# Patient Record
Sex: FEMALE | Race: WHITE | NOT HISPANIC OR LATINO | Employment: FULL TIME | URBAN - METROPOLITAN AREA
[De-identification: names, ages, dates, MRNs, and addresses within clinical notes are randomized per-mention and may not be internally consistent; named-entity substitution may affect disease eponyms.]

---

## 2023-09-13 LAB
ANION GAP IN SER/PLAS: 12 MMOL/L (ref 10–20)
BASOPHILS (10*3/UL) IN BLOOD BY AUTOMATED COUNT: 0.05 X10E9/L (ref 0–0.1)
BASOPHILS/100 LEUKOCYTES IN BLOOD BY AUTOMATED COUNT: 1 % (ref 0–2)
CALCIUM (MG/DL) IN SER/PLAS: 8.3 MG/DL (ref 8.6–10.3)
CARBON DIOXIDE, TOTAL (MMOL/L) IN SER/PLAS: 27 MMOL/L (ref 21–32)
CHLORIDE (MMOL/L) IN SER/PLAS: 105 MMOL/L (ref 98–107)
CREATININE (MG/DL) IN SER/PLAS: 0.77 MG/DL (ref 0.5–1.05)
EOSINOPHILS (10*3/UL) IN BLOOD BY AUTOMATED COUNT: 0.17 X10E9/L (ref 0–0.7)
EOSINOPHILS/100 LEUKOCYTES IN BLOOD BY AUTOMATED COUNT: 3.6 % (ref 0–6)
ERYTHROCYTE DISTRIBUTION WIDTH (RATIO) BY AUTOMATED COUNT: 13 % (ref 11.5–14.5)
ERYTHROCYTE MEAN CORPUSCULAR HEMOGLOBIN CONCENTRATION (G/DL) BY AUTOMATED: 32.8 G/DL (ref 32–36)
ERYTHROCYTE MEAN CORPUSCULAR VOLUME (FL) BY AUTOMATED COUNT: 87 FL (ref 80–100)
ERYTHROCYTES (10*6/UL) IN BLOOD BY AUTOMATED COUNT: 4.68 X10E12/L (ref 4–5.2)
GFR FEMALE: >90 ML/MIN/1.73M2
GLUCOSE (MG/DL) IN SER/PLAS: 71 MG/DL (ref 74–99)
HEMATOCRIT (%) IN BLOOD BY AUTOMATED COUNT: 40.8 % (ref 36–46)
HEMOGLOBIN (G/DL) IN BLOOD: 13.4 G/DL (ref 12–16)
IMMATURE GRANULOCYTES/100 LEUKOCYTES IN BLOOD BY AUTOMATED COUNT: 0 % (ref 0–0.9)
LEUKOCYTES (10*3/UL) IN BLOOD BY AUTOMATED COUNT: 4.8 X10E9/L (ref 4.4–11.3)
LYMPHOCYTES (10*3/UL) IN BLOOD BY AUTOMATED COUNT: 2.2 X10E9/L (ref 1.2–4.8)
LYMPHOCYTES/100 LEUKOCYTES IN BLOOD BY AUTOMATED COUNT: 46 % (ref 13–44)
MONOCYTES (10*3/UL) IN BLOOD BY AUTOMATED COUNT: 0.34 X10E9/L (ref 0.1–1)
MONOCYTES/100 LEUKOCYTES IN BLOOD BY AUTOMATED COUNT: 7.1 % (ref 2–10)
NEUTROPHILS (10*3/UL) IN BLOOD BY AUTOMATED COUNT: 2.02 X10E9/L (ref 1.2–7.7)
NEUTROPHILS/100 LEUKOCYTES IN BLOOD BY AUTOMATED COUNT: 42.3 % (ref 40–80)
PLATELETS (10*3/UL) IN BLOOD AUTOMATED COUNT: 279 X10E9/L (ref 150–450)
POTASSIUM (MMOL/L) IN SER/PLAS: 4.5 MMOL/L (ref 3.5–5.3)
SODIUM (MMOL/L) IN SER/PLAS: 139 MMOL/L (ref 136–145)
UREA NITROGEN (MG/DL) IN SER/PLAS: 18 MG/DL (ref 6–23)

## 2023-09-15 LAB — STAPH/MRSA SCREEN, CULTURE: NORMAL

## 2023-09-20 ENCOUNTER — HOSPITAL ENCOUNTER (OUTPATIENT)
Dept: DATA CONVERSION | Facility: HOSPITAL | Age: 53
End: 2023-09-26
Attending: ORTHOPAEDIC SURGERY | Admitting: ORTHOPAEDIC SURGERY
Payer: COMMERCIAL

## 2023-09-20 DIAGNOSIS — Z87.891 PERSONAL HISTORY OF NICOTINE DEPENDENCE: ICD-10-CM

## 2023-09-20 DIAGNOSIS — R51.9 HEADACHE, UNSPECIFIED: ICD-10-CM

## 2023-09-20 DIAGNOSIS — M16.12 UNILATERAL PRIMARY OSTEOARTHRITIS, LEFT HIP: ICD-10-CM

## 2023-09-21 LAB
ANION GAP IN SER/PLAS: 12 MMOL/L (ref 10–20)
BASOPHILS (10*3/UL) IN BLOOD BY AUTOMATED COUNT: 0.03 X10E9/L (ref 0–0.1)
BASOPHILS/100 LEUKOCYTES IN BLOOD BY AUTOMATED COUNT: 0.6 % (ref 0–2)
CALCIUM (MG/DL) IN SER/PLAS: 7.9 MG/DL (ref 8.6–10.3)
CARBON DIOXIDE, TOTAL (MMOL/L) IN SER/PLAS: 26 MMOL/L (ref 21–32)
CHLORIDE (MMOL/L) IN SER/PLAS: 102 MMOL/L (ref 98–107)
CREATININE (MG/DL) IN SER/PLAS: 0.73 MG/DL (ref 0.5–1.05)
EOSINOPHILS (10*3/UL) IN BLOOD BY AUTOMATED COUNT: 0.07 X10E9/L (ref 0–0.7)
EOSINOPHILS/100 LEUKOCYTES IN BLOOD BY AUTOMATED COUNT: 1.3 % (ref 0–6)
ERYTHROCYTE DISTRIBUTION WIDTH (RATIO) BY AUTOMATED COUNT: 13.1 % (ref 11.5–14.5)
ERYTHROCYTE MEAN CORPUSCULAR HEMOGLOBIN CONCENTRATION (G/DL) BY AUTOMATED: 32.5 G/DL (ref 32–36)
ERYTHROCYTE MEAN CORPUSCULAR VOLUME (FL) BY AUTOMATED COUNT: 88 FL (ref 80–100)
ERYTHROCYTES (10*6/UL) IN BLOOD BY AUTOMATED COUNT: 3.99 X10E12/L (ref 4–5.2)
GFR FEMALE: >90 ML/MIN/1.73M2
GLUCOSE (MG/DL) IN SER/PLAS: 94 MG/DL (ref 74–99)
HEMATOCRIT (%) IN BLOOD BY AUTOMATED COUNT: 35.1 % (ref 36–46)
HEMOGLOBIN (G/DL) IN BLOOD: 11.4 G/DL (ref 12–16)
IMMATURE GRANULOCYTES/100 LEUKOCYTES IN BLOOD BY AUTOMATED COUNT: 0.2 % (ref 0–0.9)
LEUKOCYTES (10*3/UL) IN BLOOD BY AUTOMATED COUNT: 5.3 X10E9/L (ref 4.4–11.3)
LYMPHOCYTES (10*3/UL) IN BLOOD BY AUTOMATED COUNT: 1.49 X10E9/L (ref 1.2–4.8)
LYMPHOCYTES/100 LEUKOCYTES IN BLOOD BY AUTOMATED COUNT: 28.2 % (ref 13–44)
MONOCYTES (10*3/UL) IN BLOOD BY AUTOMATED COUNT: 0.42 X10E9/L (ref 0.1–1)
MONOCYTES/100 LEUKOCYTES IN BLOOD BY AUTOMATED COUNT: 7.9 % (ref 2–10)
NEUTROPHILS (10*3/UL) IN BLOOD BY AUTOMATED COUNT: 3.27 X10E9/L (ref 1.2–7.7)
NEUTROPHILS/100 LEUKOCYTES IN BLOOD BY AUTOMATED COUNT: 61.8 % (ref 40–80)
PLATELETS (10*3/UL) IN BLOOD AUTOMATED COUNT: 255 X10E9/L (ref 150–450)
POTASSIUM (MMOL/L) IN SER/PLAS: 4.3 MMOL/L (ref 3.5–5.3)
SODIUM (MMOL/L) IN SER/PLAS: 136 MMOL/L (ref 136–145)
UREA NITROGEN (MG/DL) IN SER/PLAS: 11 MG/DL (ref 6–23)

## 2023-09-22 LAB
ANION GAP IN SER/PLAS: 8 MMOL/L (ref 10–20)
BASOPHILS (10*3/UL) IN BLOOD BY AUTOMATED COUNT: 0.05 X10E9/L (ref 0–0.1)
BASOPHILS/100 LEUKOCYTES IN BLOOD BY AUTOMATED COUNT: 0.6 % (ref 0–2)
CALCIUM (MG/DL) IN SER/PLAS: 7.5 MG/DL (ref 8.6–10.3)
CARBON DIOXIDE, TOTAL (MMOL/L) IN SER/PLAS: 28 MMOL/L (ref 21–32)
CHLORIDE (MMOL/L) IN SER/PLAS: 105 MMOL/L (ref 98–107)
CREATININE (MG/DL) IN SER/PLAS: 0.79 MG/DL (ref 0.5–1.05)
EOSINOPHILS (10*3/UL) IN BLOOD BY AUTOMATED COUNT: 0.12 X10E9/L (ref 0–0.7)
EOSINOPHILS/100 LEUKOCYTES IN BLOOD BY AUTOMATED COUNT: 1.5 % (ref 0–6)
ERYTHROCYTE DISTRIBUTION WIDTH (RATIO) BY AUTOMATED COUNT: 13.2 % (ref 11.5–14.5)
ERYTHROCYTE MEAN CORPUSCULAR HEMOGLOBIN CONCENTRATION (G/DL) BY AUTOMATED: 32.2 G/DL (ref 32–36)
ERYTHROCYTE MEAN CORPUSCULAR VOLUME (FL) BY AUTOMATED COUNT: 90 FL (ref 80–100)
ERYTHROCYTES (10*6/UL) IN BLOOD BY AUTOMATED COUNT: 3.87 X10E12/L (ref 4–5.2)
GFR FEMALE: 89 ML/MIN/1.73M2
GLUCOSE (MG/DL) IN SER/PLAS: 105 MG/DL (ref 74–99)
HEMATOCRIT (%) IN BLOOD BY AUTOMATED COUNT: 34.8 % (ref 36–46)
HEMOGLOBIN (G/DL) IN BLOOD: 11.2 G/DL (ref 12–16)
IMMATURE GRANULOCYTES/100 LEUKOCYTES IN BLOOD BY AUTOMATED COUNT: 0.4 % (ref 0–0.9)
LEUKOCYTES (10*3/UL) IN BLOOD BY AUTOMATED COUNT: 7.8 X10E9/L (ref 4.4–11.3)
LYMPHOCYTES (10*3/UL) IN BLOOD BY AUTOMATED COUNT: 1.2 X10E9/L (ref 1.2–4.8)
LYMPHOCYTES/100 LEUKOCYTES IN BLOOD BY AUTOMATED COUNT: 15.3 % (ref 13–44)
MONOCYTES (10*3/UL) IN BLOOD BY AUTOMATED COUNT: 0.56 X10E9/L (ref 0.1–1)
MONOCYTES/100 LEUKOCYTES IN BLOOD BY AUTOMATED COUNT: 7.2 % (ref 2–10)
NEUTROPHILS (10*3/UL) IN BLOOD BY AUTOMATED COUNT: 5.86 X10E9/L (ref 1.2–7.7)
NEUTROPHILS/100 LEUKOCYTES IN BLOOD BY AUTOMATED COUNT: 75 % (ref 40–80)
PLATELETS (10*3/UL) IN BLOOD AUTOMATED COUNT: 215 X10E9/L (ref 150–450)
POCT GLUCOSE: 80 MG/DL (ref 74–99)
POTASSIUM (MMOL/L) IN SER/PLAS: 4.1 MMOL/L (ref 3.5–5.3)
SODIUM (MMOL/L) IN SER/PLAS: 137 MMOL/L (ref 136–145)
UREA NITROGEN (MG/DL) IN SER/PLAS: 12 MG/DL (ref 6–23)

## 2023-09-23 LAB
ANION GAP IN SER/PLAS: 10 MMOL/L (ref 10–20)
BASOPHILS (10*3/UL) IN BLOOD BY AUTOMATED COUNT: 0.03 X10E9/L (ref 0–0.1)
BASOPHILS/100 LEUKOCYTES IN BLOOD BY AUTOMATED COUNT: 0.5 % (ref 0–2)
CALCIUM (MG/DL) IN SER/PLAS: 7.7 MG/DL (ref 8.6–10.3)
CARBON DIOXIDE, TOTAL (MMOL/L) IN SER/PLAS: 28 MMOL/L (ref 21–32)
CHLORIDE (MMOL/L) IN SER/PLAS: 104 MMOL/L (ref 98–107)
CREATININE (MG/DL) IN SER/PLAS: 0.68 MG/DL (ref 0.5–1.05)
EOSINOPHILS (10*3/UL) IN BLOOD BY AUTOMATED COUNT: 0.2 X10E9/L (ref 0–0.7)
EOSINOPHILS/100 LEUKOCYTES IN BLOOD BY AUTOMATED COUNT: 3.2 % (ref 0–6)
ERYTHROCYTE DISTRIBUTION WIDTH (RATIO) BY AUTOMATED COUNT: 13.1 % (ref 11.5–14.5)
ERYTHROCYTE MEAN CORPUSCULAR HEMOGLOBIN CONCENTRATION (G/DL) BY AUTOMATED: 31.8 G/DL (ref 32–36)
ERYTHROCYTE MEAN CORPUSCULAR VOLUME (FL) BY AUTOMATED COUNT: 90 FL (ref 80–100)
ERYTHROCYTES (10*6/UL) IN BLOOD BY AUTOMATED COUNT: 3.57 X10E12/L (ref 4–5.2)
GFR FEMALE: >90 ML/MIN/1.73M2
GLUCOSE (MG/DL) IN SER/PLAS: 111 MG/DL (ref 74–99)
HEMATOCRIT (%) IN BLOOD BY AUTOMATED COUNT: 32.1 % (ref 36–46)
HEMOGLOBIN (G/DL) IN BLOOD: 10.2 G/DL (ref 12–16)
IMMATURE GRANULOCYTES/100 LEUKOCYTES IN BLOOD BY AUTOMATED COUNT: 0.3 % (ref 0–0.9)
LEUKOCYTES (10*3/UL) IN BLOOD BY AUTOMATED COUNT: 6.2 X10E9/L (ref 4.4–11.3)
LYMPHOCYTES (10*3/UL) IN BLOOD BY AUTOMATED COUNT: 1.12 X10E9/L (ref 1.2–4.8)
LYMPHOCYTES/100 LEUKOCYTES IN BLOOD BY AUTOMATED COUNT: 18 % (ref 13–44)
MONOCYTES (10*3/UL) IN BLOOD BY AUTOMATED COUNT: 0.47 X10E9/L (ref 0.1–1)
MONOCYTES/100 LEUKOCYTES IN BLOOD BY AUTOMATED COUNT: 7.6 % (ref 2–10)
NEUTROPHILS (10*3/UL) IN BLOOD BY AUTOMATED COUNT: 4.38 X10E9/L (ref 1.2–7.7)
NEUTROPHILS/100 LEUKOCYTES IN BLOOD BY AUTOMATED COUNT: 70.4 % (ref 40–80)
PLATELETS (10*3/UL) IN BLOOD AUTOMATED COUNT: 205 X10E9/L (ref 150–450)
POTASSIUM (MMOL/L) IN SER/PLAS: 4 MMOL/L (ref 3.5–5.3)
SODIUM (MMOL/L) IN SER/PLAS: 138 MMOL/L (ref 136–145)
UREA NITROGEN (MG/DL) IN SER/PLAS: 10 MG/DL (ref 6–23)

## 2023-09-24 LAB
ANION GAP IN SER/PLAS: 10 MMOL/L (ref 10–20)
CALCIUM (MG/DL) IN SER/PLAS: 8 MG/DL (ref 8.6–10.3)
CARBON DIOXIDE, TOTAL (MMOL/L) IN SER/PLAS: 27 MMOL/L (ref 21–32)
CHLORIDE (MMOL/L) IN SER/PLAS: 105 MMOL/L (ref 98–107)
CREATININE (MG/DL) IN SER/PLAS: 0.7 MG/DL (ref 0.5–1.05)
ERYTHROCYTE DISTRIBUTION WIDTH (RATIO) BY AUTOMATED COUNT: 13.4 % (ref 11.5–14.5)
ERYTHROCYTE MEAN CORPUSCULAR HEMOGLOBIN CONCENTRATION (G/DL) BY AUTOMATED: 31.4 G/DL (ref 32–36)
ERYTHROCYTE MEAN CORPUSCULAR VOLUME (FL) BY AUTOMATED COUNT: 92 FL (ref 80–100)
ERYTHROCYTES (10*6/UL) IN BLOOD BY AUTOMATED COUNT: 3.97 X10E12/L (ref 4–5.2)
GFR FEMALE: >90 ML/MIN/1.73M2
GLUCOSE (MG/DL) IN SER/PLAS: 85 MG/DL (ref 74–99)
HEMATOCRIT (%) IN BLOOD BY AUTOMATED COUNT: 36.6 % (ref 36–46)
HEMOGLOBIN (G/DL) IN BLOOD: 11.5 G/DL (ref 12–16)
LEUKOCYTES (10*3/UL) IN BLOOD BY AUTOMATED COUNT: 5.8 X10E9/L (ref 4.4–11.3)
PLATELETS (10*3/UL) IN BLOOD AUTOMATED COUNT: 190 X10E9/L (ref 150–450)
POTASSIUM (MMOL/L) IN SER/PLAS: 4.1 MMOL/L (ref 3.5–5.3)
SODIUM (MMOL/L) IN SER/PLAS: 138 MMOL/L (ref 136–145)
UREA NITROGEN (MG/DL) IN SER/PLAS: 8 MG/DL (ref 6–23)

## 2023-09-25 LAB
ANION GAP IN SER/PLAS: 11 MMOL/L (ref 10–20)
CALCIUM (MG/DL) IN SER/PLAS: 8.3 MG/DL (ref 8.6–10.3)
CARBON DIOXIDE, TOTAL (MMOL/L) IN SER/PLAS: 25 MMOL/L (ref 21–32)
CHLORIDE (MMOL/L) IN SER/PLAS: 106 MMOL/L (ref 98–107)
CREATININE (MG/DL) IN SER/PLAS: 0.64 MG/DL (ref 0.5–1.05)
ERYTHROCYTE DISTRIBUTION WIDTH (RATIO) BY AUTOMATED COUNT: 13.2 % (ref 11.5–14.5)
ERYTHROCYTE MEAN CORPUSCULAR HEMOGLOBIN CONCENTRATION (G/DL) BY AUTOMATED: 32.4 G/DL (ref 32–36)
ERYTHROCYTE MEAN CORPUSCULAR VOLUME (FL) BY AUTOMATED COUNT: 87 FL (ref 80–100)
ERYTHROCYTES (10*6/UL) IN BLOOD BY AUTOMATED COUNT: 3.94 X10E12/L (ref 4–5.2)
GFR FEMALE: >90 ML/MIN/1.73M2
GLUCOSE (MG/DL) IN SER/PLAS: 161 MG/DL (ref 74–99)
HEMATOCRIT (%) IN BLOOD BY AUTOMATED COUNT: 34.3 % (ref 36–46)
HEMOGLOBIN (G/DL) IN BLOOD: 11.1 G/DL (ref 12–16)
LEUKOCYTES (10*3/UL) IN BLOOD BY AUTOMATED COUNT: 4.7 X10E9/L (ref 4.4–11.3)
PLATELETS (10*3/UL) IN BLOOD AUTOMATED COUNT: 287 X10E9/L (ref 150–450)
POTASSIUM (MMOL/L) IN SER/PLAS: 3.9 MMOL/L (ref 3.5–5.3)
SODIUM (MMOL/L) IN SER/PLAS: 138 MMOL/L (ref 136–145)
UREA NITROGEN (MG/DL) IN SER/PLAS: 10 MG/DL (ref 6–23)

## 2023-09-26 LAB
ANION GAP IN SER/PLAS: 13 MMOL/L (ref 10–20)
CALCIUM (MG/DL) IN SER/PLAS: 8.3 MG/DL (ref 8.6–10.3)
CARBON DIOXIDE, TOTAL (MMOL/L) IN SER/PLAS: 25 MMOL/L (ref 21–32)
CHLORIDE (MMOL/L) IN SER/PLAS: 105 MMOL/L (ref 98–107)
CREATININE (MG/DL) IN SER/PLAS: 0.71 MG/DL (ref 0.5–1.05)
ERYTHROCYTE DISTRIBUTION WIDTH (RATIO) BY AUTOMATED COUNT: 13.4 % (ref 11.5–14.5)
ERYTHROCYTE MEAN CORPUSCULAR HEMOGLOBIN CONCENTRATION (G/DL) BY AUTOMATED: 32.3 G/DL (ref 32–36)
ERYTHROCYTE MEAN CORPUSCULAR VOLUME (FL) BY AUTOMATED COUNT: 89 FL (ref 80–100)
ERYTHROCYTES (10*6/UL) IN BLOOD BY AUTOMATED COUNT: 3.75 X10E12/L (ref 4–5.2)
GFR FEMALE: >90 ML/MIN/1.73M2
GLUCOSE (MG/DL) IN SER/PLAS: 92 MG/DL (ref 74–99)
HEMATOCRIT (%) IN BLOOD BY AUTOMATED COUNT: 33.4 % (ref 36–46)
HEMOGLOBIN (G/DL) IN BLOOD: 10.8 G/DL (ref 12–16)
LEUKOCYTES (10*3/UL) IN BLOOD BY AUTOMATED COUNT: 7.1 X10E9/L (ref 4.4–11.3)
PLATELETS (10*3/UL) IN BLOOD AUTOMATED COUNT: 323 X10E9/L (ref 150–450)
POTASSIUM (MMOL/L) IN SER/PLAS: 4.1 MMOL/L (ref 3.5–5.3)
SODIUM (MMOL/L) IN SER/PLAS: 139 MMOL/L (ref 136–145)
UREA NITROGEN (MG/DL) IN SER/PLAS: 15 MG/DL (ref 6–23)

## 2023-09-29 VITALS
SYSTOLIC BLOOD PRESSURE: 121 MMHG | DIASTOLIC BLOOD PRESSURE: 79 MMHG | HEIGHT: 67 IN | WEIGHT: 252.43 LBS | BODY MASS INDEX: 39.62 KG/M2

## 2023-09-30 NOTE — PROGRESS NOTES
Service: Orthopaedics     Subjective Data:   VIRGILIO POLO is a 53 year old Female who is Hospital Day # 4 and POD #3 for 1. left total hip arthroplasty;2. ;3. ;4. ;5.     Patient awake in bed. she is complaining of headaches but overall doing well. Just got migrane medication so hopeful that her headache resolves. Passing flatus.    Overnight Events: Patient had an uneventful night.     Objective Data:     Objective Information:      T   P  R  BP   MAP  SpO2   Value  36.7  54  15  101/66      98%  Date/Time 9/23 8:18 9/23 8:18 9/23 8:18 9/23 8:18    9/23 8:18  Range  (36.1C - 36.9C )  (54 - 89 )  (15 - 16 )  (101 - 122 )/ (66 - 72 )    (94% - 98% )  Highest temp of 36.9 C was recorded at 9/22 16:49      Pain reported at 9/23 8:18: 0 = None    Physical Exam by System:    Constitutional: Well developed, awake/alert/oriented  x3, no distress, alert and cooperative   Respiratory/Thorax: Patent airways, CTAB, normal  breath sounds with good chest expansion, thorax symmetric   Cardiovascular: Regular, rate and rhythm, 2+ equal  pulses of the extremities,   Gastrointestinal: Nondistended, soft, non-tender,  +BS,   Genitourinary: voiding without difficulty   Musculoskeletal: LLE  skin intact with dressing c/d/i  calf supple/non tender  +PF/DF  2+DP pulse     NVI  BCR <3 seconds  +EHL   Psychological: Appropriate mood and behavior   Skin: Warm and dry, no lesions, no rashes     Recent Lab Results:    Results:    CBC: 9/23/2023 05:56              \     Hgb     /                              \     10.2 L    /  WBC  ----------------  Plt               6.2       ----------------    205              /     Hct     \                              /     32.1 L    \            RBC: 3.57 L    MCV: 90     Neutrophil %: 70.4      BMP: 9/23/2023 05:56  NA+        Cl-     BUN  /                         138    104    10  /  --------------------------------  Glucose                ---------------------------  111 H    K+      HCO3-   Creat \                         4.0  28    0.68  \  Calcium : 7.7 L    Anion Gap : 10      Radiology Results:    Results:        Impression:    Satisfactory appearance status post left hip arthroplasty.     Xray Pelvis 1 or 2 View [Sep 20 2023  2:33PM]      Assessment and Plan:   Comorbidities:  ·  Comorbidity Other     Code Status:  ·  Code Status Full Code     Assessment:    VIRGILIO POLO is a 53 year old Female who is Hospital Day # 4 and POD #3 for 1. left total hip arthroplasty. Progressing well.     Patient awake in bed. she is complaining of a headache. she did not wear her CPAP last night as it is broke. her pain level is  controlled otherwise with current pain regimen. she denies fever or chills, chest pain or shortness of breath.    Plan:  -WBAT with assistive device as needed   -OOBT/ PT/ Mobilize   -Ice to affected area   -Keep mepilex in place x1 week postoperative     Neuro:   -patient currently with headache, will monitor   -Continue current pain regimen   -acetaminophen  - as needed dilaudid and oxycodone    CV: post operative complication of persistently lightheaded with PT   -Continue to monitor vital signs, VSS  - continue IVF    Resp:   Hx: RICK on CPAP, PE (in 2017 2/2 hormonal therapy)  -per patient, cpap is broke and she has not used  -ISS Q 1 hour while awake     GI:  Hx: GERD  -Regular diet   -PRN Antiemetics  -Stool softener/ laxative   - daily PPI    :   - monitor I and Os  - voiding independently    Heme:  -DVT proph: SCDs/ TEDs   -ASA 81mg BID   -H&H 11.2/34.8 from 11.4/35.1    ID:   -Afebrile  CBC appropriate   -Monitor for s/s of infection   - ancef x 3, completed.      Dispo: Discussed with . Will DC today       Attestation:   Note Completion:  I am a:  Resident/Fellow   Attending Attestation I reviewed the resident/fellow?s documentation and discussed the patient with the resident/fellow.  I agree with the resident/fellow?s medical  decision making as  documented in the note.          Electronic Signatures:  Ar Webb (Resident))  (Signed 23-Sep-2023 10:11)   Authored: Service, Subjective Data, Objective Data, Assessment  and Plan, Note Completion  Wilfred Sol)  (Signed 24-Sep-2023 16:49)   Authored: Note Completion   Co-Signer: Service, Subjective Data, Objective Data, Assessment and Plan, Note Completion      Last Updated: 24-Sep-2023 16:49 by Wilfred Sol)

## 2023-09-30 NOTE — PROGRESS NOTES
Service: Orthopaedics     Subjective Data:   VIRGILIO POLO is a 53 year old Female who is Hospital Day # 2 and POD #1 for 1. left total hip arthroplasty;2. ;3. ;4. ;5.     late entry, seen early AM    was nauseous and lightheaded while working with PT yeterday prompting her to be admitted overnight. Was also unable to void in the post-operative period and was straight cathed around 6 am- 1000 cc.   Today reports pain is well controlled, shes tolerating diet w/o nausea.   Still has some quad weakness in operative extremity    denies N/T, F/C, CP, SOB, N/V.    Objective Data:     Objective Information:      T   P  R  BP   MAP  SpO2   Value  37.1  89  20  113/71   93  93%  Date/Time 9/21 12:00 9/21 12:00 9/21 12:00 9/21 12:00  9/20 15:40 9/21 12:00  Range  (36.2C - 37.1C )  (71 - 89 )  (14 - 20 )  (106 - 138 )/ (67 - 97 )  (93 - 93 )  (93% - 99% )  Highest temp of 37.1 C was recorded at 9/21 12:00      Pain reported at 9/21 13:26: 6 = Moderate    Physical Exam by System:    Constitutional: awake/alert/oriented x3, no distress,  alert and cooperative   Eyes: clear sclera   Respiratory/Thorax: Patent airways, CTAB, normal  breath sounds   Cardiovascular: Regular, rate and rhythm, no murmurs   Genitourinary: awaiting independent void   Musculoskeletal: left hip dressing CDI w/o surrounding  erythema or drainage. 3/5 quad activation, fires EHL/FHL/TA/TP/EDL/FDL. decreased sensation in SP distribution, intact in DP/T. 2+ DP/PT, <2 CRT. Compartments soft, compressible.   Extremities: SCDs in place   Neurological: No focal deficits   Psychological: Appropriate mood and behavior   Skin: Warm and dry     Medication:    Medications:          Continuous Medications       --------------------------------    1. Sodium Chloride 0.9% Infusion:  1000  mL  IntraVenous  <Continuous>         Scheduled Medications       --------------------------------    1. Acetaminophen:  975  mg  Oral  Every 8 Hours    2. Aspirin Enteric Coated:   81  mg  Oral  2 Times a Day    3. Docusate:  100  mg  Oral  2 Times a Day    4. Pantoprazole:  40  mg  Oral  Daily Before First Meal    5. Polyethylene Glycol:  17  gram(s)  Oral  2 Times a Day    6. Scopolamine TransDermal:  1  patch  TransDermal  Every 72 Hours         PRN Medications       --------------------------------    1. Bisacodyl Enteric Coated:  10  mg  Oral  Daily    2. Bisacodyl Rectal:  10  mg  Rectal  Daily    3. diphenhydrAMINE:  25  mg  Oral  Every 6 Hours    4. HYDROmorphone Injectable:  0.4  mg  IntraVenous Push  Every 2 Hours    5. Ondansetron Injectable:  4  mg  IntraVenous Push  Every 6 Hours    6. oxyCODONE Immediate Release:  5  mg  Oral  Every 4 Hours    7. oxyCODONE Immediate Release:  10  mg  Oral  Every 4 Hours    8. Sore Throat Lozenge:  1  lozenge(s)  Oral  Every 4 Hours        Recent Lab Results:    Results:        I have reviewed these laboratory results:    Basic Metabolic Panel  21-Sep-2023 04:58:00      Result Value    Glucose, Serum  94    NA  136    K  4.3    CL  102    Bicarbonate, Serum  26    Anion Gap, Serum  12    BUN  11    CREAT  0.73    GFR Female  >90    Calcium, Serum  7.9   L     Complete Blood Count + Differential  21-Sep-2023 04:58:00      Result Value    White Blood Cell Count  5.3    Red Blood Cell Count  3.99   L   HGB  11.4   L   HCT  35.1   L   MCV  88    MCHC  32.5    PLT  255    RDW-CV  13.1    Neutrophil %  61.8    Immature Granulocytes %  0.2    Lymphocyte %  28.2    Monocyte %  7.9    Eosinophil %  1.3    Basophil %  0.6    Neutrophil Count  3.27    Lymphocyte Count  1.49    Monocyte Count  0.42    Eosinophil Count  0.07    Basophil Count  0.03        Radiology Results:    Results:        Impression:    Satisfactory appearance status post left hip arthroplasty.     Xray Pelvis 1 or 2 View [Sep 20 2023  2:33PM]      Assessment and Plan:   Comorbidities:  ·  Comorbidity Other     Code Status:  ·  Code Status Full Code     Assessment:    VIRGILIO POLO is a  53 year old Female who is Hospital Day # 2 and POD #1 for 1. left total hip arthroplasty.  Worked with PT twice this afternoon with little progress due to lightheadedness and decreased safety with ambulation.     Ortho/ Musculoskeletal: Osteoarthritis s/p left total hip replacement. Denies numbness, dressing C/D/I, able to wiggle toes, neurovascularly intact, <CRT, 2+ DP/PT pulses  -WBAT with FWW   -OOBT/ PT/ Mobilize   -Ice to affected area   -Keep mepilex in place x1 week postoperative     Neuro: Acute postop pain as expected - well controlled on current medication regimen   -Continue current pain regimen     CV: BP stable, persistently lightheaded w/ sitting up/OOB  - BP checks during PTs assessment not indicative of orthostatic hypotension but still symptomatic   -Continue to monitor vital signs   - continue IVF    Resp: CTAB, on RA  Hx: RICK on CPAP, PE (in 2017 2/2 hormonal therapy)  -IS Q 1 hour while awake     GI: Tolerating diet  Hx: GERD  -Regular diet   -PRN Antiemetics  -Stool softener/ laxative   - daily PPI    : straight cath x 1, has voided independently since   - Cr. 0.73  - monitor I and Os  - bladder scan Q6 if no void, straight cath if > 500    Heme: H/H 11.4/35.1  -DVT proph: SCDs/ TEDs   -ASA 81mg BID       Endo: BS 94  no hx of issues    ID: Afebrile, wbc 5.3  -Monitor for s/s of infection   - ancef x 3, completed.    The patient?s post-operative recovery has not progressed sufficiently to allow them to be safely discharged to home at this time and they require additional pain control, physical therapy and monitoring of their medical condition prior to discharge.    Dispo: Discussed with . Anticipate d/c tomorrow pending progression with PT    Total time spent 35 minutes, and greater than 50% of  time was spent in counseling/coordination of care        Electronic Signatures:  Gina Hernández (PAC)  (Signed 21-Sep-2023 15:17)   Authored: Service, Subjective Data, Objective Data,  Assessment  and Plan, Note Completion      Last Updated: 21-Sep-2023 15:17 by Gina Hernández (PAC)

## 2023-09-30 NOTE — DISCHARGE SUMMARY
Send Summary:   Discharge Summary Providers:  Provider Role Provider Name   · Attending Wilfred Sol   · Referring Wilfred Sol   · Consulting Shaikh, Aasef   · Primary Dwight Sunshine       Note Recipients: Dwight Sunshine MD - [id number]  []  Shaikh, Aasef, MD       Discharge:    Summary:   Admission Date: .20-Sep-2023 07:46:00   Discharge Date: 26-Sep-2023   Attending Physician at Discharge: Wilfred Sol   Admission Reason: left hip OA   Final Discharge Diagnoses: left EZRA   Procedures: Date: 20-Sep-2023 13:45:00  Procedure Name: 1. left total hip arthroplasty   Condition at Discharge: Satisfactory   Disposition at Discharge: Home Health Care - New   Vital Signs:        T   P  R  BP   MAP  SpO2   Value  36.4  74  16  117/65      94%  Date/Time 9/26 6:00 9/26 6:00 9/26 6:00 9/26 6:00    9/26 6:00  Range  (36.2C - 37C )  (73 - 87 )  (16 - 18 )  (110 - 152 )/ (65 - 84 )    (94% - 98% )  Highest temp of 37 C was recorded at 9/26 1:12    Date:            Weight/Scale Type:  Height:   20-Sep-2023 19:12  114.5  kg / standing  170.1  cm  Physical Exam:    PE:  Constitutional: A&Ox3, calm and cooperative, NAD  Eyes: EOMI, clear sclera   Cardiovascular: Normal rate and regular rhythm.   Respiratory/Thorax: CTAB, on RA   Genitourinary: Voiding independently   Musculoskeletal: left hip dressing CDI w/o surrounding erythema or drainage. fires EHL/FHL/TA/TP/EDL/FDL. SILT in SP/DP/T distribution. 2+ DP/PT, <2 CRT. Compartments soft, compressible.  Extremities: SCDs in place  Neurological: A&Ox3, No focal deficits   Psychological: Appropriate mood and behavior  Skin: Warm and dry, no rashes or lesions      Hospital Course:    Briefly, the patient is a 53 year-old female with past Hx of osteoarthritis of left hip.  Pt is now S/P left EZRA      HOSPITAL COURSE: The patient underwent Left EZRA on 9/20/23 which patient tolerated well and remained stable in the postoperative period. On postoperative day #1, patient  was tolerating a diet, and remained afebrile with stable vital signs. Patient was  ordered oral and intravenous narcotics for pain control.  On day of discharge patient was tolerating a diet, afebrile, had stable vital signs and lab values, with adequate pain control. The wound was clean and dry. Patient was instructed to follow up  in the office within 2 weeks. Pt given prescription for Percocet for pain, 81 mg aspirin BID for DVT and compression stockings for DVT prophylaxis. Colace as needed for constipation. Home PT/ OT was arranged prior to discharge.    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 No acute pathology.  Neurology recommended neurosurgery follow up as outpatient for pineal gland eval and neurology follow-up regarding headaches. Patient was discharged with  fioricet and toradol for HA pain control and percocet for pain control related to surgery.       Discharge Information:    and Continuing Care:   Lab Results - Pending:    None  Radiology Results - Pending: None   Discharge Instructions:    Additional Orders:           Additional Instructions:   Instructions after Total Hip Replacement    Diet: resume your regular    Activity: weight bearing as tolerated, walker or cane for balance.     Hip dislocation precautions - limit hip flexion to 90 degrees, do not cross legs, elevated toilet seat, do not twist, use assistive aids to put on socks and shoes (continue for 10 weeks).    Anticoagulation meds:  You will be discharged with a prescription for 81mg aspirin twice daily for a total of 4 weeks.     James Hose: Should be worn during the day for the next 4 weeks. Can remove at night.     Pain Meds: You will be sent home with a prescription for pain meds as well as a stool softener to help with constipation.  If you need a refill on your pain meds please contact Dr. Sol?s office. A 48 hour notice will need to be given for all  pain medication refills.     Driving:  You will not be able to drive for 4-6 weeks.     Showering: You may shower, OK for soap/water to get on wound but avoid direct spray. Do not scrub at incision. NO tub baths or submerging wound.     Wound: Your incision is closed with skin glue, no need for staples / stitches to be removed. Do not pick at, allow to flake off on own. Your incision is covered with a dressing called Mepilex, is it waterproof and to remain in place for 7 days. After  7 days you may leave the incision site open to air.   if your incision has staples, these are to be removed 2 weeks after surgery.     Follow-up: Dr. Sol?s office within 2 weeks.   - Office [phone]    Follow up with Neurosurgery for pineal cyst upon discharge from hospital     Follow up with headache clinic with dr singh upon discharge from hospital [phone]    CALL PHYSICIAN:  ? Excessive nausea, vomiting, diarrhea greater than 24 hours.  ? Inability to urinate every 8-12 hours and bladder becomes too full and is painful.  ? Incision site: increased redness and or swelling; increased pain and or tenderness; progressive drainage or an unusual odor.  Call office if drainage continues beyond 5 days of surgery.  ? Excessive Bleeding: Slow general oozing that completely soaks dressing or fresh bright red bleeding or bleeding that will not stop.   ? Operative leg: has a change in color, numbness, tingling, and coldness to the touch or excessive pain..    Home Care Certification:           Home Care Agency:    [facility] [phone]          Skilled Disciplines Ordered:   PT    Home Care Services:           Home Care Skilled Service:   Rehab (PT/OT/SP eval and treat)    Discharge Medications: Home Medication   Aspirin Enteric Coated 81 mg oral delayed release tablet - 1 tab(s) orally 2 times a day   ketorolac 10 mg oral tablet - 1 tab(s) orally every 8 hours   Colace 100 mg oral capsule - 1 cap(s) orally 2 times a day   oxycodone-acetaminophen 5 mg-325 mg oral  tablet - 1 tab(s) orally every 6 hours as needed for moderate to severe pain    ICD10: G89.18  omeprazole 10 mg oral delayed release capsule - 1 cap(s) orally once a day   ondansetron 4 mg oral tablet, disintegrating - 1 tab(s) orally every 8 hours   butalbital/acetaminophen/caffeine 50 mg-325 mg-40 mg oral tablet - 1 tab(s) orally 3 times a day  as needed for headaches     -.Meds to Beds     PRN Medication     DNR Status:   ·  Code Status Code Status order at time of discharge: Full Code       Electronic Signatures:  Gina Hernández (PAC)  (Signed 26-Sep-2023 11:21)   Authored: Send Summary, Summary Content, Ongoing Care,  DNR Status, Note Completion      Last Updated: 26-Sep-2023 11:21 by Gina Hernández (PAC)

## 2023-09-30 NOTE — PROGRESS NOTES
Service: Neurology     Subjective Data:   VIRGILIO POLO is a 53 year old Female who is Hospital Day # 6 and POD #5 for 1. left total hip arthroplasty;2. ;3. ;4. ;5.     Patient seen and examined this AM. Per patient and RN, no acute events overnight.     Taking over care for Dr. Burns. Called by MEG regarding her headache. Partially relieved with steroids last night, but has some chest discomfort. Her headache was better this morning.     Says that she has had 2 migraines in the past. Right sided in nature. Now, her MEEHAN has been bifrontal and perirobital with associated nausea and photophobia. No phonophobia or emesis. Denies vision changes, speech changes or focal motor weakness.     CTH with pineal cyst.    Objective Data:     Objective Information:      T   P  R  BP   MAP  SpO2   Value  36.2  73  17  147/76   87  95%  Date/Time 9/25 7:52 9/25 7:52 9/25 7:52 9/25 7:52  9/24 14:57 9/25 7:52  Range  (36.2C - 37C )  (73 - 85 )  (16 - 17 )  (118 - 147 )/ (71 - 82 )  (87 - 99 )  (95% - 98% )  Highest temp of 37 C was recorded at 9/24 7:50      Pain reported at 9/25 3:53: 0 = None    Physical Exam by System:    Neurological: General: Pleasant, resting in bed in  NAD  Cardio: Regular rate  Pulm: Breathing comfortably on RA  Neuro:   Alert, oriented to exact date and location. Following complex commands. No dysarthria or aphasia.  Pupils equal and reactive. EOMI. No sofia papilledema. No facial droop. Hearing intact to interview.   Motor strength 5/5 throughout. Normal bulk and tone.  Sensation intact to light touch throughout.   No dysmetria on FNF.     Medication:    Medications:          Continuous Medications       --------------------------------  No continuous medications are active       Scheduled Medications       --------------------------------    1. Acetaminophen:  650  mg  Oral  Every 6 Hours    2. Aspirin Enteric Coated:  81  mg  Oral  2 Times a Day    3. Docusate:  100  mg  Oral  2 Times a Day    4.  Pantoprazole:  40  mg  Oral  Daily Before First Meal    5. Polyethylene Glycol:  17  gram(s)  Oral  2 Times a Day         PRN Medications       --------------------------------    1. APAP/Butalbital/Caffeine 50/325/40 mg Tab:  1  tablet(s)  Oral  4 Times a Day    2. Bisacodyl Enteric Coated:  10  mg  Oral  Daily    3. Bisacodyl Rectal:  10  mg  Rectal  Daily    4. diphenhydrAMINE:  25  mg  Oral  Every 6 Hours    5. HYDROmorphone Injectable:  0.4  mg  IntraVenous Push  Every 2 Hours    6. Ondansetron Injectable:  4  mg  IntraVenous Push  Every 6 Hours    7. oxyCODONE Immediate Release:  5  mg  Oral  Every 4 Hours    8. oxyCODONE Immediate Release:  10  mg  Oral  Every 4 Hours    9. Sore Throat Lozenge:  1  lozenge(s)  Oral  Every 4 Hours        Recent Lab Results:    Results:    CBC: 9/25/2023 04:56              \     Hgb     /                              \     11.1 L    /  WBC  ----------------  Plt               4.7       ----------------    287              /     Hct     \                              /     34.3 L    \            RBC: 3.94 L    MCV: 87           BMP: 9/25/2023 04:56  NA+        Cl-     BUN  /                         138    106    10  /  --------------------------------  Glucose                ---------------------------  161 H    K+     HCO3-   Creat \                         3.9  25    0.64  \  Calcium : 8.3 L    Anion Gap : 11      Radiology Results:    Results:    I have reviewed this radiology result:         Impression:    1. No acute intracranial abnormality.     2. 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 This is not resulting in hydronephrosis or mass  effect. In most cases these are usually incidental the correlation  with patient's symptoms and neurology consultation recommended.        MACRO:  None.     CT Head without Contrast [Sep 24 2023  4:41PM]      Assessment and Plan:   Code Status:  ·  Code Status Full Code     Assessment:       Impression:   Headache with migrainous features. Partially relieved with steroids per Dr. Burns. However, this is not her typical migraine  pattern. She was incidentally found to have a pineal cyst, that merits further investigation with MRI. Likely this is an incidental findings, but discussed with patient and she would prefer further evaluation.       Recommendations:   - MRI Brain w/ and w/o contrast  - trial of magnesium and fluids now; okay with fiorecet for short term use (can be addictive and cause rebound HA)  - have to avoid NSAIDs in setting of recent hip replacement  - follow up Neurology (Dr. Nielsen or Dr. Paiz; headache specialists)  - follow up with NSGY  - if MRI unremarkable for any new pathology or mass effect, okay for dispo from Neurologic perspective  - patient instructed to call 911 or go to the ED if any new or worsening symptoms  - recommendations relayed to Radha Urban at bedside    Hung Holt DO        Electronic Signatures:  Hung Holt ( (Fellow))  (Signed 25-Sep-2023 11:47)   Authored: Service, Subjective Data, Objective Data, Assessment  and Plan, Note Completion      Last Updated: 25-Sep-2023 11:47 by Hung Holt ( (Fellow))

## 2023-09-30 NOTE — PROGRESS NOTES
Service: Orthopaedics     Subjective Data:   VIRGILIO POLO is a 53 year old Female who is Hospital Day # 3 and POD #2 for 1. left total hip arthroplasty;2. ;3. ;4. ;5.     Patient awake in bed. she is complaining of a headache. she did not wear her CPAP last night as it is broke. her pain level is controlled otherwise with current pain regimen. she denies fever or chills,  chest pain or shortness of breath.    Overnight Events: Patient had an uneventful night.     Objective Data:     Objective Information:      T   P  R  BP   MAP  SpO2   Value  36.1  86  16  122/72   83  94%  Date/Time 9/22 7:49 9/22 7:49 9/22 7:49 9/22 7:49  9/21 15:51 9/22 7:49  Range  (36.1C - 37.1C )  (83 - 96 )  (16 - 20 )  (106 - 138 )/ (71 - 97 )  (83 - 83 )  (93% - 97% )  Highest temp of 37.1 C was recorded at 9/21 12:00      Pain reported at 9/22 3:50: 6 = Moderate    Physical Exam by System:    Constitutional: Well developed, awake/alert/oriented  x3, no distress, alert and cooperative   Respiratory/Thorax: Patent airways, CTAB, normal  breath sounds with good chest expansion, thorax symmetric   Cardiovascular: Regular, rate and rhythm, 2+ equal  pulses of the extremities,   Gastrointestinal: Nondistended, soft, non-tender,  +BS,   Genitourinary: voiding without difficulty   Musculoskeletal: LLE  skin intact with dressing c/d/i  calf supple/non tender  +PF/DF  +2DP/PT pulses  NVI  BCR <3 seconds  +EHL   Psychological: Appropriate mood and behavior   Skin: Warm and dry, no lesions, no rashes     Medication:    Medications:          Continuous Medications       --------------------------------  No continuous medications are active       Scheduled Medications       --------------------------------    1. Acetaminophen:  975  mg  Oral  Every 8 Hours    2. Aspirin Enteric Coated:  81  mg  Oral  2 Times a Day    3. Docusate:  100  mg  Oral  2 Times a Day    4. Lactated Ringers IV Bolus:  500  mL  IntraVenous Piggyback  Once    5.  Pantoprazole:  40  mg  Oral  Daily Before First Meal    6. Polyethylene Glycol:  17  gram(s)  Oral  2 Times a Day         PRN Medications       --------------------------------    1. Bisacodyl Enteric Coated:  10  mg  Oral  Daily    2. Bisacodyl Rectal:  10  mg  Rectal  Daily    3. diphenhydrAMINE:  25  mg  Oral  Every 6 Hours    4. HYDROmorphone Injectable:  0.4  mg  IntraVenous Push  Every 2 Hours    5. Ondansetron Injectable:  4  mg  IntraVenous Push  Every 6 Hours    6. oxyCODONE Immediate Release:  5  mg  Oral  Every 4 Hours    7. oxyCODONE Immediate Release:  10  mg  Oral  Every 4 Hours    8. Sore Throat Lozenge:  1  lozenge(s)  Oral  Every 4 Hours        Recent Lab Results:    Results:        I have reviewed these laboratory results:    Basic Metabolic Panel  Trending View      Result 22-Sep-2023 05:17:00  21-Sep-2023 04:58:00    Glucose, Serum 105   H   94       136    K 4.1   4.3       102    Bicarbonate, Serum 28   26    Anion Gap, Serum 8   L   12    BUN 12   11    CREAT 0.79   0.73    GFR Female 89   >90    Calcium, Serum 7.5   L   7.9   L        Complete Blood Count + Differential  Trending View      Result 22-Sep-2023 05:17:00  21-Sep-2023 04:58:00    White Blood Cell Count 7.8   5.3    Red Blood Cell Count 3.87   L   3.99   L    HGB 11.2   L   11.4   L    HCT 34.8   L   35.1   L    MCV 90   88    MCHC 32.2   32.5       255    RDW-CV 13.2   13.1    Neutrophil % 75.0   61.8    Immature Granulocytes % 0.4   0.2    Lymphocyte % 15.3   28.2    Monocyte % 7.2   7.9    Eosinophil % 1.5   1.3    Basophil % 0.6   0.6    Neutrophil Count 5.86   3.27    Lymphocyte Count 1.20   1.49    Monocyte Count 0.56   0.42    Eosinophil Count 0.12   0.07    Basophil Count 0.05   0.03          Radiology Results:    Results:        Impression:    Satisfactory appearance status post left hip arthroplasty.     Xray Pelvis 1 or 2 View [Sep 20 2023  2:33PM]      Assessment and Plan:   Comorbidities:  ·   Comorbidity Other     Code Status:  ·  Code Status Full Code     Assessment:    VIRGILIO POLO is a 53 year old Female who is Hospital Day # 3 and POD #2 for 1. left total hip arthroplasty    Patient awake in bed. she is complaining of a headache. she did not wear her CPAP last night as it is broke. her pain level is  controlled otherwise with current pain regimen. she denies fever or chills, chest pain or shortness of breath.    Plan:  -WBAT with assistive device as needed   -OOBT/ PT/ Mobilize   -Ice to affected area   -Keep mepilex in place x1 week postoperative     Neuro:   -patient currently with headache, will monitor   -Continue current pain regimen   -acetaminophen  - as needed dilaudid and oxycodone    CV: post operative complication of persistently lightheaded with PT   -Continue to monitor vital signs, VSS  - continue IVF    Resp:   Hx: RICK on CPAP, PE (in 2017 2/2 hormonal therapy)  -per patient, cpap is broke and she has not used  -ISS Q 1 hour while awake     GI:  Hx: GERD  -Regular diet   -PRN Antiemetics  -Stool softener/ laxative   - daily PPI    :   - monitor I and Os  - voiding independently    Heme:  -DVT proph: SCDs/ TEDs   -ASA 81mg BID   -H&H 11.2/34.8 from 11.4/35.1      Endo:   -serum glucose 105    ID:   -Afebrile  -wbc 7.8  -Monitor for s/s of infection   - ancef x 3, completed.      Dispo: Discussed with . Anticipate d/c todaypending progression with PT    Total time spent 30 minutes, and greater than 50% of  time was spent in counseling/coordination of care        Electronic Signatures:  Radha Urban (APRN-CNP)  (Signed 22-Sep-2023 09:25)   Authored: Service, Subjective Data, Objective Data, Assessment  and Plan, Note Completion      Last Updated: 22-Sep-2023 09:25 by Radha Urban (APRN-CNP)

## 2023-09-30 NOTE — PROGRESS NOTES
Service: Orthopaedics     Subjective Data:   VIRGILIO POLO is a 53 year old Female who is Hospital Day # 6 and POD #5 for 1. left total hip arthroplasty;2. ;3. ;4. ;5.    Additional Information:    Patient sitting up in bed, eating breakfast. She states she slept all night without waking up due to headache. she reports so far this morning she has no headache  or photosensitivity. She received Solu-medrol last night around 10 pm for migraine shes had the last 3 days. she does complain of some tightness to chest since receiving the steroid, it is not constant and appears to be in no acute distress. she has received  steroids in the past with no difficulty and VSS.       Objective Data:     Objective Information:        T   P  R  BP   MAP  SpO2   Value  36.2  73  17  147/76   87  95%  Date/Time 9/25 7:52 9/25 7:52 9/25 7:52 9/25 7:52  9/24 14:57 9/25 7:52  Range  (36.2C - 37C )  (73 - 85 )  (16 - 17 )  (118 - 147 )/ (71 - 82 )  (87 - 99 )  (95% - 98% )  Highest temp of 37 C was recorded at 9/24 7:50    Physical Exam by System     Constitutional: Well developed, awake/alert/oriented  x3, no distress, alert and cooperative   Respiratory/Thorax: Patent airways, CTAB, normal  breath sounds with good chest expansion, thorax symmetric   Cardiovascular: Regular, rate and rhythm, 2+ equal  pulses of the extremities   Gastrointestinal: Nondistended, soft, non-tender,  +BS   Genitourinary: voiding without difficulty   Musculoskeletal: LLE  skin intact with dressing c/d/i  calf supple/non tender  +PF/DF  +2DP/PT pulses  NVI  BCR <3 seconds  +EHL   Neurological: alert and oriented x3, intact senses,  motor   Psychological: Appropriate mood and behavior   Skin: Warm and dry, no lesions, no rashes     Medication     Medications:          Continuous Medications       --------------------------------  No continuous medications are active       Scheduled Medications       --------------------------------    1. Acetaminophen:   650  mg  Oral  Every 6 Hours    2. Aspirin Enteric Coated:  81  mg  Oral  2 Times a Day    3. Docusate:  100  mg  Oral  2 Times a Day    4. Pantoprazole:  40  mg  Oral  Daily Before First Meal    5. Polyethylene Glycol:  17  gram(s)  Oral  2 Times a Day         PRN Medications       --------------------------------    1. APAP/Butalbital/Caffeine 50/325/40 mg Tab:  1  tablet(s)  Oral  4 Times a Day    2. Bisacodyl Enteric Coated:  10  mg  Oral  Daily    3. Bisacodyl Rectal:  10  mg  Rectal  Daily    4. diphenhydrAMINE:  25  mg  Oral  Every 6 Hours    5. HYDROmorphone Injectable:  0.4  mg  IntraVenous Push  Every 2 Hours    6. Ondansetron Injectable:  4  mg  IntraVenous Push  Every 6 Hours    7. oxyCODONE Immediate Release:  5  mg  Oral  Every 4 Hours    8. oxyCODONE Immediate Release:  10  mg  Oral  Every 4 Hours    9. Sore Throat Lozenge:  1  lozenge(s)  Oral  Every 4 Hours        Recent Lab Results     Results:        I have reviewed these laboratory results:    Complete Blood Count  Trending View      Result 25-Sep-2023 04:56:00  24-Sep-2023 05:36:00    White Blood Cell Count 4.7   5.8    Red Blood Cell Count 3.94   L   3.97   L    HGB 11.1   L   11.5   L    HCT 34.3   L   36.6    MCV 87   92    MCHC 32.4   31.4   L       190    RDW-CV 13.2   13.4        Basic Metabolic Panel  Trending View      Result 25-Sep-2023 04:56:00  24-Sep-2023 05:36:00    Glucose, Serum 161   H   85       138    K 3.9   4.1       105    Bicarbonate, Serum 25   27    Anion Gap, Serum 11   10    BUN 10   8    CREAT 0.64   0.70    GFR Female >90   >90    Calcium, Serum 8.3   L   8.0   L          Radiology Results     Results:        Impression:    1. No acute intracranial abnormality.     2. 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 This is not resulting in hydronephrosis or mass  effect. In most cases these are usually incidental the correlation  with patient's symptoms and neurology  consultation recommended.        MACRO:  None.     CT Head without Contrast [Sep 24 2023  4:41PM]      Impression:    Satisfactory appearance status post left hip arthroplasty.     Xray Pelvis 1 or 2 View [Sep 20 2023  2:33PM]      Assessment and Plan:   Comorbidities   ·  Comorbidity obesity   ·  Obesity obesity (BMI 35-39.9)   ·  BMI 39.57     Code Status   ·  Code Status Full Code     Assessment:    VIRGILIO POLO is a 53 year old Female who is Hospital Day # 6 and POD #5 for 1. left total hip arthroplasty       Patient sitting up in bed, eating breakfast. She states she slept all night without waking up due to headache. she reports so far this morning she has no headache  or photosensitivity. She received Solu-medrol last night around 10 pm for migraine shes had the last 3 days. she does complain of some tightness to chest since receiving the steroid, it is not constant and appears to be in no acute distress. she has received  steroids in the past with no difficulty and VSS.    Plan:  -WBAT with assistive device as needed   -OOBT/ PT/ Mobilize   -Ice to affected area   -Keep mepilex in place x1 week postoperative     Neuro:   -solumedrol given 10pm yesterday per neurology for migraine, was helpful  -Continue current pain regimen   -acetaminophen  - as needed dilaudid and oxycodone    CV:   -Continue to monitor vital signs, VSS    Resp:   Hx: RICK on CPAP, PE (in 2017 2/2 hormonal therapy)  -per patient, cpap is broke and she has not used  -ISS Q 1 hour while awake   -Sp02 with vitals, no obvious respiratory distress    GI:  Hx: GERD  -Regular diet   -PRN Antiemetics  -Stool softener/ laxative   - daily PPI    :   - monitor I and Os  - voiding independently    Heme:  -DVT proph: SCDs/ TEDs   -ASA 81mg BID   -daily CBC    ID:   -Afebrile  -CBC stable   -Monitor for s/s of infection   - ancef x 3, completed.    Dispo: Discussed with . Will DC today pending migraine, response to solu-medrol and PT  recs    I spent 30 minutes with this patient and/or family.  Greater than 50% of this time was spent in counseling and/or coordination of care.            Electronic Signatures for Addendum Section:   Radha Urban (APRN-CNP) (Signed Addendum 25-Sep-2023 10:30)   1030 saw patient with dr hammond. patient headache has worsened. new orders placed for LR bolus, Magnesium 2gm, MRI with and without contrast to eval pineal cyst.  will need to follow up as outpatient with neurosurgery and with dr singh or dr ordonez [facility]     Electronic Signatures:  Radha Urban (APRN-CNP)  (Signed 25-Sep-2023 08:48)   Authored: Service, Subjective Data, Objective Data, Assessment  and Plan, Note Completion      Last Updated: 25-Sep-2023 10:30 by Radha Urban (APRN-CNP)

## 2023-09-30 NOTE — PROGRESS NOTES
Service: Orthopaedics     Subjective Data:   VIRGILIO POLO is a 53 year old Female who is Hospital Day # 5 and POD #4 for 1. left total hip arthroplasty;2. ;3. ;4. ;5.     Just completed PT session. has persistent frontal headaches. had bowel movements. happy that neurology was consulted.    Overnight Events: Patient had an uneventful night.     Objective Data:     Objective Information:      T   P  R  BP   MAP  SpO2   Value  37  85  16  135/82   99  95%  Date/Time 9/24 7:50 9/24 7:50 9/24 7:50 9/24 7:50  9/24 7:50 9/24 7:50  Range  (36.1C - 37C )  (54 - 92 )  (15 - 16 )  (101 - 135 )/ (65 - 82 )  (99 - 99 )  (95% - 98% )  Highest temp of 37 C was recorded at 9/24 7:50      Pain reported at 9/24 4:05: sleeping    Physical Exam by System:    Constitutional: Well developed, awake/alert/oriented  x3, no distress, alert and cooperative   Respiratory/Thorax: Patent airways, CTAB, normal  breath sounds with good chest expansion, thorax symmetric   Cardiovascular: Regular, rate and rhythm, 2+ equal  pulses of the extremities,   Gastrointestinal: Nondistended, soft, non-tender,  +BS,   Genitourinary: voiding without difficulty   Musculoskeletal: LLE  skin intact with dressing c/d/i  calf supple/non tender  +PF/DF  2+DP pulse     NVI  BCR <3 seconds  +EHL   Psychological: Appropriate mood and behavior   Skin: Warm and dry, no lesions, no rashes     Recent Lab Results:    Results:    CBC: 9/24/2023 05:36              \     Hgb     /                              \     11.5 L    /  WBC  ----------------  Plt               5.8       ----------------    190              /     Hct     \                              /     36.6       \            RBC: 3.97 L    MCV: 92           BMP: 9/24/2023 05:36  NA+        Cl-     BUN  /                         138    105    8  /  --------------------------------  Glucose                ---------------------------  85    K+     HCO3-   Creat \                         4.1  27     0.70  \  Calcium : 8.0 L    Anion Gap : 10      Radiology Results:    Results:        Impression:    Satisfactory appearance status post left hip arthroplasty.     Xray Pelvis 1 or 2 View [Sep 20 2023  2:33PM]      Assessment and Plan:   Comorbidities:  ·  Comorbidity Other     Code Status:  ·  Code Status Full Code     Assessment:    VIRGILIO POLO is a 53 year old Female who is Hospital Day # 4 and POD #3 for 1. left total hip arthroplasty. Progressing well.     Patient awake in bed. she is complaining of a headache. she did not wear her CPAP last night as it is broke. her pain level is  controlled otherwise with current pain regimen. she denies fever or chills, chest pain or shortness of breath.    Plan:  -WBAT with assistive device as needed   -OOBT/ PT/ Mobilize   -Ice to affected area   -Keep mepilex in place x1 week postoperative     Neuro:   -patient currently with headache, neurology recommends head CT to rule out any pathology (although low suspicion) and then will treat for migrane  -Continue current pain regimen   -acetaminophen  - as needed dilaudid and oxycodone    CV: post operative complication of persistently lightheaded with PT   -Continue to monitor vital signs, VSS  - continue IVF    Resp:   Hx: RICK on CPAP, PE (in 2017 2/2 hormonal therapy)  -per patient, cpap is broke and she has not used  -ISS Q 1 hour while awake     GI:  Hx: GERD  -Regular diet   -PRN Antiemetics  -Stool softener/ laxative   - daily PPI    :   - monitor I and Os  - voiding independently    Heme:  -DVT proph: SCDs/ TEDs   -ASA 81mg BID   -daily CBC    ID:   -Afebrile  CBC appropriate   -Monitor for s/s of infection   - ancef x 3, completed.      Dispo: Discussed with . Will DC today       Attestation:   Note Completion:  I am a:  Resident/Fellow   Attending Attestation I reviewed the resident/fellow?s documentation and discussed the patient with the resident/fellow.  I agree with the resident/fellow?s medical   decision making as documented in the note.          Electronic Signatures:  Ar Webb (Resident))  (Signed 24-Sep-2023 11:00)   Authored: Service, Subjective Data, Objective Data, Assessment  and Plan, Note Completion  Wilfred Sol)  (Signed 24-Sep-2023 16:49)   Authored: Note Completion   Co-Signer: Service, Subjective Data, Objective Data, Assessment and Plan, Note Completion      Last Updated: 24-Sep-2023 16:49 by Wilfred Sol)

## 2023-10-01 NOTE — OP NOTE
Post Operative Note:     PreOp Diagnosis: left hip arthritis   Post-Procedure Diagnosis: same   Procedure: 1. left total hip arthroplasty  2.   3.   4.   5.   Surgeon: aishwarya   Resident/Fellow/Other Assistant: Rashid   Estimated Blood Loss (mL): 150   Specimen: no   Findings: djd     Operative Report Dictated:  Dictation: not applicable - note contains Operative  Report   Operative Report:    Preoperative diagnosis: Left hip arthritis  Postop diagnosis: Same  Procedure: Total hip arthroplasty left  Anesthetic: Spinal  Complications: None  Equipment used: DePuy Whiteface stem and [place] cup  Operative procedure: Preoperative huddle was performed with patient identification procedure and site verification.  Patient placed under spinal anesthetic and then positioned in the lateral decubitus position using the pegboard.  Left hip and leg were  sterilely prepped and draped.  Standard posterior approach was made to the hip.  Short rotators and capsule were taken down in 1 layer and teed proximally.  Femoral head was dislocated.  Femoral neck cut was made using neck cutting guide.  Next deep acetabular  retractors were placed, labrum was excised.  Canal was then the S-10 was then prepared in the standard fashion.  Ultimately reamed to 49 mm.  A 50 mm [place] sector GRIPTION cup was placed at a good interference fit.  A 10 degree extended liner was placed  in the 2 o'clock position  Next attention was paid to the femur which was repaired in the standard fashion.  Ultimately a size 4 Whiteface standard offset stem was used with a neutral neck length and the 32 mm outer diameter ceramic head.  This was a stable construct and leg lengths  appeared equal.  The wound was dry prior to closure it was irrigated and closed in layers.  The skin was closed in running subcu suture with a Dermabond dressing.  Patient taken recovery and tolerated procedure well.      Electronic Signatures:  Wilfred Sol)  (Signed 20-Sep-2023  13:48)   Authored: Post Operative Note, Note Completion      Last Updated: 20-Sep-2023 13:48 by Wilfred Sol)

## 2023-10-03 ENCOUNTER — ANCILLARY PROCEDURE (OUTPATIENT)
Dept: RADIOLOGY | Facility: CLINIC | Age: 53
End: 2023-10-03
Payer: COMMERCIAL

## 2023-10-03 ENCOUNTER — OFFICE VISIT (OUTPATIENT)
Dept: ORTHOPEDIC SURGERY | Facility: CLINIC | Age: 53
End: 2023-10-03
Payer: COMMERCIAL

## 2023-10-03 VITALS — BODY MASS INDEX: 39.24 KG/M2 | WEIGHT: 250 LBS | HEIGHT: 67 IN

## 2023-10-03 DIAGNOSIS — Z96.642 STATUS POST LEFT HIP REPLACEMENT: Primary | ICD-10-CM

## 2023-10-03 DIAGNOSIS — Z96.642 STATUS POST LEFT HIP REPLACEMENT: ICD-10-CM

## 2023-10-03 DIAGNOSIS — Z96.642 HISTORY OF LEFT HIP REPLACEMENT: ICD-10-CM

## 2023-10-03 PROCEDURE — 73502 X-RAY EXAM HIP UNI 2-3 VIEWS: CPT | Mod: LT,FY

## 2023-10-03 PROCEDURE — 73502 X-RAY EXAM HIP UNI 2-3 VIEWS: CPT | Mod: LEFT SIDE | Performed by: RADIOLOGY

## 2023-10-03 PROCEDURE — 99024 POSTOP FOLLOW-UP VISIT: CPT | Performed by: ORTHOPAEDIC SURGERY

## 2023-10-03 RX ORDER — MELOXICAM 15 MG/1
15 TABLET ORAL DAILY
Qty: 30 TABLET | Refills: 11 | Status: SHIPPED | OUTPATIENT
Start: 2023-10-03 | End: 2024-10-02

## 2023-10-03 NOTE — LETTER
October 3, 2023     Dwight Sunshine MD  [id number] [facility] [address]    Patient: aRdha Escobar   YOB: 1970   Date of Visit: 10/3/2023       Dear Dr. Dwight Sunshine MD:    Thank you for referring Radha Escobar to me for evaluation. Below are my notes for this consultation.  If you have questions, please do not hesitate to call me. I look forward to following your patient along with you.       Sincerely,     Wilfred Sol MD      CC: No Recipients  ______________________________________________________________________________________    Follow-up left total hip arthroplasty  Has some itching around her incision  Much less pain  Is finishing up home physical therapy  Feels like the left leg is a bit longer  Examination: Some redness about the incision no drainage seems to be demarcated where the glue was in bandage I removed the bandage advised her to wash the area lightly with a hypoallergenic soap expect this to resolve having remove the bandage  X-rays show prosthesis in good position she may be lengthening half centimeters  She will need to have her right hip replaced explained that the sensation of leg length inequality is common postoperatively she can use a shoe lift temporarily if she desires but ordinarily she will adjust with time  Follow-up in 2 to 3 months and prescription for outpatient therapy  Additionally is complaining of her right hand where IV access was apparently she had a blown IV in that hand and has some residual swelling recommended meloxicam elevation and time

## 2023-10-03 NOTE — PROGRESS NOTES
Follow-up left total hip arthroplasty  Has some itching around her incision  Much less pain  Is finishing up home physical therapy  Feels like the left leg is a bit longer  Examination: Some redness about the incision no drainage seems to be demarcated where the glue was in bandage I removed the bandage advised her to wash the area lightly with a hypoallergenic soap expect this to resolve having remove the bandage  X-rays show prosthesis in good position she may be lengthening half centimeters  She will need to have her right hip replaced explained that the sensation of leg length inequality is common postoperatively she can use a shoe lift temporarily if she desires but ordinarily she will adjust with time  Follow-up in 2 to 3 months and prescription for outpatient therapy  Additionally is complaining of her right hand where IV access was apparently she had a blown IV in that hand and has some residual swelling recommended meloxicam elevation and time

## 2023-10-17 ENCOUNTER — APPOINTMENT (OUTPATIENT)
Dept: ORTHOPEDIC SURGERY | Facility: CLINIC | Age: 53
End: 2023-10-17
Payer: COMMERCIAL

## 2024-01-16 ENCOUNTER — APPOINTMENT (OUTPATIENT)
Dept: ORTHOPEDIC SURGERY | Facility: CLINIC | Age: 54
End: 2024-01-16
Payer: COMMERCIAL

## 2024-01-19 ENCOUNTER — HOSPITAL ENCOUNTER (OUTPATIENT)
Dept: RADIOLOGY | Facility: CLINIC | Age: 54
Discharge: HOME | End: 2024-01-19
Payer: COMMERCIAL

## 2024-01-19 ENCOUNTER — OFFICE VISIT (OUTPATIENT)
Dept: ORTHOPEDIC SURGERY | Facility: CLINIC | Age: 54
End: 2024-01-19
Payer: COMMERCIAL

## 2024-01-19 VITALS — WEIGHT: 246 LBS | HEIGHT: 67 IN | BODY MASS INDEX: 38.61 KG/M2

## 2024-01-19 DIAGNOSIS — M25.552 HIP PAIN, LEFT: ICD-10-CM

## 2024-01-19 DIAGNOSIS — M70.62 TROCHANTERIC BURSITIS OF LEFT HIP: Primary | ICD-10-CM

## 2024-01-19 PROCEDURE — 20610 DRAIN/INJ JOINT/BURSA W/O US: CPT | Performed by: ORTHOPAEDIC SURGERY

## 2024-01-19 PROCEDURE — 73502 X-RAY EXAM HIP UNI 2-3 VIEWS: CPT | Mod: LEFT SIDE | Performed by: RADIOLOGY

## 2024-01-19 PROCEDURE — 1036F TOBACCO NON-USER: CPT | Performed by: ORTHOPAEDIC SURGERY

## 2024-01-19 PROCEDURE — 73502 X-RAY EXAM HIP UNI 2-3 VIEWS: CPT | Mod: LT

## 2024-01-19 PROCEDURE — 99213 OFFICE O/P EST LOW 20 MIN: CPT | Performed by: ORTHOPAEDIC SURGERY

## 2024-01-19 RX ORDER — METHYLPREDNISOLONE ACETATE 40 MG/ML
40 INJECTION, SUSPENSION INTRA-ARTICULAR; INTRALESIONAL; INTRAMUSCULAR; SOFT TISSUE
Status: COMPLETED | OUTPATIENT
Start: 2024-01-19 | End: 2024-01-19

## 2024-01-19 RX ORDER — LIDOCAINE HYDROCHLORIDE 20 MG/ML
2 INJECTION, SOLUTION INFILTRATION; PERINEURAL
Status: COMPLETED | OUTPATIENT
Start: 2024-01-19 | End: 2024-01-19

## 2024-01-19 RX ADMIN — METHYLPREDNISOLONE ACETATE 40 MG: 40 INJECTION, SUSPENSION INTRA-ARTICULAR; INTRALESIONAL; INTRAMUSCULAR; SOFT TISSUE at 09:11

## 2024-01-19 RX ADMIN — LIDOCAINE HYDROCHLORIDE 2 ML: 20 INJECTION, SOLUTION INFILTRATION; PERINEURAL at 09:11

## 2024-01-19 ASSESSMENT — LIFESTYLE VARIABLES: HOW OFTEN DO YOU HAVE A DRINK CONTAINING ALCOHOL: 2-4 TIMES A MONTH

## 2024-01-19 NOTE — PROGRESS NOTES
Patient is approximately 4-month status post left total hip arthroplasty having some lateral pain  She is also troubled by knee replacements were done several years ago and she just has persistent pain and stiffness she also has advancing arthritis of the right hip.  Past medical,family and social histories have been reviewed and are up to date.  All other body systems have been reviewed and are negative for complaint.  Constitutional: Well-developed well-nourished   Eyes: Sclerae anicteric, pupils equal and round  HENT: Normocephalic atraumatic  Cardiovascular: Pulses full, regular rate and rhythm  Respiratory: Breathing not labored, no wheezing  Integumentary: Skin intact, no lesions or rashes  Neurological: Sensation intact, no gross strength deficits, reflexes equal  Psychiatric: Alert oriented and appropriate  Hematologic/lymphatic: No lymphadenopathy  Left hip: She is tender along the trochanter down the lateral aspect of the thigh antalgic gait  X-rays show prosthesis in good position assessment trochanteric bursitis right hip arthritis  Injected hip today continue therapy discussed indications for contralateral hip replacement  L Inj/Asp: L greater trochanteric bursa on 1/19/2024 9:11 AM  Indications: pain  Details: 21 G needle, lateral approach  Medications: 40 mg methylPREDNISolone acetate 40 mg/mL; 2 mL lidocaine 20 mg/mL (2 %)

## 2024-03-06 NOTE — CONSULTS
Service:   Consult:  Consult requested by (Attending Name): Wilfred Sol   Reason: Bilateral frontal headaches, assoacited photo/phonophobia,  assocaited nausea, dizziness, blurred vision. Pt is postop Day 2 from L total hip arthroplsty and developing worsening headaches despite taking her normal anti-headache meds     History of Present Illness:   History Present Illness:  HPI:    This is a 74-year-old woman with a history of total hip surgery here postoperatively, I am consulted for postoperative headache.  The headache is bifrontal associated  with phonophobia and nausea it is constant nonsteroidal anti-inflammatory did not make any better therefore neurology was consulted.  She does have history of migraine and possibly postoperatively now it is worse.  Imaging is pending i.e. head CT.  Neurology was consulted for further input.    Past medical history as noted in HPI and medical record.  Review of system, headache.  Social history: As noted in medical record.  Family history: As noted in medical record.    Examination: Alert oriented x4, extraocular in full, saccades normal, VOR normal, worse pursuit normal.  Facial sensation intact face symmetric.  No abnormal involuntary movements of the face.  Pupils are reactive visual fields full.  Power intact in all extremities proximally and distally however there is a limited movement of the hip because of the hip surgery which was not examined.  Involuntary movements.  Sensations intact to touch temperature tactile.  Reflexes are symmetric  in both knee and biceps and triceps.             Allergies:  ·  No Known Allergies :     Objective:     Objective Information:        T   P  R  BP   MAP  SpO2   Value  37  85  16  135/82   99  95%  Date/Time 9/24 7:50 9/24 7:50 9/24 7:50 9/24 7:50  9/24 7:50 9/24 7:50  Range  (36.1C - 37C )  (54 - 92 )  (15 - 16 )  (101 - 135 )/ (65 - 82 )  (99 - 99 )  (95% - 98% )  Highest temp of 37 C was recorded at 9/24  7:50    Medications:    Medications:          Continuous Medications       --------------------------------  No continuous medications are active       Scheduled Medications       --------------------------------    1. Acetaminophen:  650  mg  Oral  Every 6 Hours    2. Aspirin Enteric Coated:  81  mg  Oral  2 Times a Day    3. Docusate:  100  mg  Oral  2 Times a Day    4. Pantoprazole:  40  mg  Oral  Daily Before First Meal    5. Polyethylene Glycol:  17  gram(s)  Oral  2 Times a Day         PRN Medications       --------------------------------    1. APAP/Butalbital/Caffeine 50/325/40 mg Tab:  1  tablet(s)  Oral  4 Times a Day    2. Bisacodyl Enteric Coated:  10  mg  Oral  Daily    3. Bisacodyl Rectal:  10  mg  Rectal  Daily    4. diphenhydrAMINE:  25  mg  Oral  Every 6 Hours    5. HYDROmorphone Injectable:  0.4  mg  IntraVenous Push  Every 2 Hours    6. Ondansetron Injectable:  4  mg  IntraVenous Push  Every 6 Hours    7. oxyCODONE Immediate Release:  5  mg  Oral  Every 4 Hours    8. oxyCODONE Immediate Release:  10  mg  Oral  Every 4 Hours    9. Sore Throat Lozenge:  1  lozenge(s)  Oral  Every 4 Hours        Recent Lab Results:    Results:        I have reviewed these laboratory results:    Complete Blood Count  24-Sep-2023 05:36:00      Result Value    White Blood Cell Count  5.8    Red Blood Cell Count  3.97   L   HGB  11.5   L   HCT  36.6    MCV  92    MCHC  31.4   L   PLT  190    RDW-CV  13.4      Basic Metabolic Panel  24-Sep-2023 05:36:00      Result Value    Glucose, Serum  85    NA  138    K  4.1    CL  105    Bicarbonate, Serum  27    Anion Gap, Serum  10    BUN  8    CREAT  0.70    GFR Female  >90    Calcium, Serum  8.0   L     Complete Blood Count + Differential  23-Sep-2023 05:56:00      Result Value    White Blood Cell Count  6.2    Red Blood Cell Count  3.57   L   HGB  10.2   L   HCT  32.1   L   MCV  90    MCHC  31.8   L   PLT  205    RDW-CV  13.1    Neutrophil %  70.4    Immature Granulocytes %   0.3    Lymphocyte %  18.0    Monocyte %  7.6    Eosinophil %  3.2    Basophil %  0.5    Neutrophil Count  4.38    Lymphocyte Count  1.12   L   Monocyte Count  0.47    Eosinophil Count  0.20    Basophil Count  0.03        Radiology Results:    Results:        Impression:    Moderate to advanced left and moderate right hip joint osteoarthritis.     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     Xray Hip 2 View [Sep 14 2023 11:31AM]      Conclusion:  Normal sinus rhythm  Low voltage QRS, consider pulmonary disease, pericardial effusion, or normal variant  Cannot rule out Anterior infarct , age undetermined  Abnormal ECG  No previous ECGs available  Confirmed by Jorge Dumas ([id number]) on 9/13/2023 4:33:43 PM     Electrocardiogram 12 Lead [Sep 13 2023  4:33PM]        Problem List:       Admitting Dx:   Postoperative pain:        Additional Dx:   Arthritis of left hip:    Postoperative stiffness of total knee replacement, sequela :        Medical History:   Hypertension: Onset Date: 14-Sep-2023    Assessment/Recommendations:  Assessment:    Assessment and plan: We have a 53-year-old woman with history of total hip surgery now presenting for postoperative headache bifrontal headache with photophobia phonophobia  consistent with migraine.  Head CT peding, if unremarkale then likely this is migraine. In that case, suggest Solu-Medrol IV piggyback, 1 g x 1 if that does not work then we will do magnesium sulfate IV piggyback 1 g x 1 and if magnesium does not work  then we will consider Depakote 1 g IV x1.  If that does not work then we will further discuss with care.  60-minute spent care of this patient.    Consult Status:  Consult Status    (select all that apply): initial  consult complete, will follow   Consult Order ID: [id number]       Electronic Signatures:  Shaikh, Aasef (MD)  (Signed 24-Sep-2023 13:40)   Authored: Service, History of Present Illness,  Allergies,  Objective, Assessment/Recommendations, Note Completion      Last Updated: 24-Sep-2023 13:40 by Shaikh, Aasef (MD)

## 2024-12-05 ENCOUNTER — APPOINTMENT (OUTPATIENT)
Dept: ORTHOPEDIC SURGERY | Facility: CLINIC | Age: 54
End: 2024-12-05
Payer: COMMERCIAL

## 2025-03-27 DIAGNOSIS — Z96.642 STATUS POST LEFT HIP REPLACEMENT: ICD-10-CM

## 2025-03-27 RX ORDER — AMOXICILLIN 500 MG/1
2000 TABLET, FILM COATED ORAL ONCE
Qty: 4 TABLET | Refills: 0 | Status: SHIPPED | OUTPATIENT
Start: 2025-03-27 | End: 2025-03-27

## 2025-04-28 ENCOUNTER — TELEPHONE (OUTPATIENT)
Dept: ORTHOPEDIC SURGERY | Facility: CLINIC | Age: 55
End: 2025-04-28
Payer: COMMERCIAL

## 2025-04-28 DIAGNOSIS — Z96.642 STATUS POST LEFT HIP REPLACEMENT: Primary | ICD-10-CM

## 2025-04-28 RX ORDER — AMOXICILLIN 500 MG/1
CAPSULE ORAL
Qty: 4 CAPSULE | Refills: 6 | Status: SHIPPED | OUTPATIENT
Start: 2025-04-28

## 2025-07-01 ENCOUNTER — TELEPHONE (OUTPATIENT)
Dept: ORTHOPEDIC SURGERY | Facility: CLINIC | Age: 55
End: 2025-07-01
Payer: COMMERCIAL

## 2025-07-01 DIAGNOSIS — Z96.642 STATUS POST LEFT HIP REPLACEMENT: Primary | ICD-10-CM

## 2025-07-01 RX ORDER — AMOXICILLIN 500 MG/1
CAPSULE ORAL
Qty: 4 CAPSULE | Refills: 6 | Status: SHIPPED | OUTPATIENT
Start: 2025-07-01